# Patient Record
Sex: FEMALE
[De-identification: names, ages, dates, MRNs, and addresses within clinical notes are randomized per-mention and may not be internally consistent; named-entity substitution may affect disease eponyms.]

---

## 2023-02-25 ENCOUNTER — NURSE TRIAGE (OUTPATIENT)
Dept: OTHER | Facility: CLINIC | Age: 58
End: 2023-02-25

## 2023-02-26 NOTE — TELEPHONE ENCOUNTER
Location of patient: [place]    Subjective: Caller states  Headache, weakness, blood sugar is 300, trouble breathing    Current Symptoms: shortness of breath, weakness    Associated Symptoms: NA    Pain Severity: 0/10; N/A; none    Temperature: no fever reported by unknown method    What has been tried: monitoring    Recommended disposition: Go to ED Now    Care advice provided, patient verbalizes understanding; denies any other questions or concerns. Outcome:   Will attempt to convince mother to go to ED      This triage is a result of a call to the [address] [place] [address]    Reason for Disposition   [1] MODERATE difficulty breathing (e.g., speaks in phrases, SOB even at rest, pulse 100-120) AND [2] NEW-onset or WORSE than normal    Protocols used: Breathing Difficulty-ADULT-AH